# Patient Record
Sex: FEMALE | Race: WHITE | Employment: OTHER | URBAN - METROPOLITAN AREA
[De-identification: names, ages, dates, MRNs, and addresses within clinical notes are randomized per-mention and may not be internally consistent; named-entity substitution may affect disease eponyms.]

---

## 2018-10-13 ENCOUNTER — APPOINTMENT (OUTPATIENT)
Dept: GENERAL RADIOLOGY | Age: 68
End: 2018-10-13
Attending: EMERGENCY MEDICINE
Payer: MEDICARE

## 2018-10-13 ENCOUNTER — HOSPITAL ENCOUNTER (EMERGENCY)
Age: 68
Discharge: HOME | End: 2018-10-13
Attending: EMERGENCY MEDICINE
Payer: MEDICARE

## 2018-10-13 VITALS
TEMPERATURE: 97.5 F | OXYGEN SATURATION: 100 % | WEIGHT: 265 LBS | RESPIRATION RATE: 16 BRPM | HEART RATE: 67 BPM | SYSTOLIC BLOOD PRESSURE: 160 MMHG | HEIGHT: 64 IN | DIASTOLIC BLOOD PRESSURE: 58 MMHG | BODY MASS INDEX: 45.24 KG/M2

## 2018-10-13 DIAGNOSIS — M17.12 OSTEOARTHRITIS OF LEFT KNEE, UNSPECIFIED OSTEOARTHRITIS TYPE: ICD-10-CM

## 2018-10-13 DIAGNOSIS — S89.92XA LEFT KNEE INJURY, INITIAL ENCOUNTER: Primary | ICD-10-CM

## 2018-10-13 DIAGNOSIS — I10 ESSENTIAL HYPERTENSION: ICD-10-CM

## 2018-10-13 PROCEDURE — 73562 X-RAY EXAM OF KNEE 3: CPT

## 2018-10-13 PROCEDURE — 74011250637 HC RX REV CODE- 250/637: Performed by: PHYSICIAN ASSISTANT

## 2018-10-13 PROCEDURE — 99283 EMERGENCY DEPT VISIT LOW MDM: CPT

## 2018-10-13 RX ORDER — GLIMEPIRIDE 4 MG/1
4 TABLET ORAL
COMMUNITY

## 2018-10-13 RX ORDER — ESOMEPRAZOLE MAGNESIUM 40 MG/1
80 CAPSULE, DELAYED RELEASE ORAL DAILY
COMMUNITY

## 2018-10-13 RX ORDER — PIOGLITAZONEHYDROCHLORIDE 30 MG/1
30 TABLET ORAL DAILY
COMMUNITY

## 2018-10-13 RX ORDER — LISINOPRIL 10 MG/1
5 TABLET ORAL DAILY
COMMUNITY

## 2018-10-13 RX ORDER — METFORMIN HYDROCHLORIDE EXTENDED-RELEASE TABLETS 1000 MG/1
1000 TABLET, FILM COATED, EXTENDED RELEASE ORAL 2 TIMES DAILY
COMMUNITY

## 2018-10-13 RX ORDER — TRAMADOL HYDROCHLORIDE 50 MG/1
50 TABLET ORAL
Status: COMPLETED | OUTPATIENT
Start: 2018-10-13 | End: 2018-10-13

## 2018-10-13 RX ORDER — DICLOFENAC SODIUM 10 MG/G
2 GEL TOPICAL 4 TIMES DAILY
Qty: 100 G | Refills: 0 | Status: SHIPPED | OUTPATIENT
Start: 2018-10-13

## 2018-10-13 RX ORDER — ROSUVASTATIN CALCIUM 5 MG/1
5 TABLET, COATED ORAL
COMMUNITY

## 2018-10-13 RX ORDER — ESOMEPRAZOLE MAGNESIUM 20 MG/1
FOR SUSPENSION ORAL DAILY
COMMUNITY

## 2018-10-13 RX ADMIN — TRAMADOL HYDROCHLORIDE 50 MG: 50 TABLET, FILM COATED ORAL at 12:35

## 2018-10-13 NOTE — DISCHARGE INSTRUCTIONS
High Blood Pressure: Care Instructions  Your Care Instructions    If your blood pressure is usually above 130/80, you have high blood pressure, or hypertension. That means the top number is 130 or higher or the bottom number is 80 or higher, or both. Despite what a lot of people think, high blood pressure usually doesn't cause headaches or make you feel dizzy or lightheaded. It usually has no symptoms. But it does increase your risk for heart attack, stroke, and kidney or eye damage. The higher your blood pressure, the more your risk increases. Your doctor will give you a goal for your blood pressure. Your goal will be based on your health and your age. Lifestyle changes, such as eating healthy and being active, are always important to help lower blood pressure. You might also take medicine to reach your blood pressure goal.  Follow-up care is a key part of your treatment and safety. Be sure to make and go to all appointments, and call your doctor if you are having problems. It's also a good idea to know your test results and keep a list of the medicines you take. How can you care for yourself at home? Medical treatment  · If you stop taking your medicine, your blood pressure will go back up. You may take one or more types of medicine to lower your blood pressure. Be safe with medicines. Take your medicine exactly as prescribed. Call your doctor if you think you are having a problem with your medicine. · Talk to your doctor before you start taking aspirin every day. Aspirin can help certain people lower their risk of a heart attack or stroke. But taking aspirin isn't right for everyone, because it can cause serious bleeding. · See your doctor regularly. You may need to see the doctor more often at first or until your blood pressure comes down. · If you are taking blood pressure medicine, talk to your doctor before you take decongestants or anti-inflammatory medicine, such as ibuprofen.  Some of these medicines can raise blood pressure. · Learn how to check your blood pressure at home. Lifestyle changes  · Stay at a healthy weight. This is especially important if you put on weight around the waist. Losing even 10 pounds can help you lower your blood pressure. · If your doctor recommends it, get more exercise. Walking is a good choice. Bit by bit, increase the amount you walk every day. Try for at least 30 minutes on most days of the week. You also may want to swim, bike, or do other activities. · Avoid or limit alcohol. Talk to your doctor about whether you can drink any alcohol. · Try to limit how much sodium you eat to less than 2,300 milligrams (mg) a day. Your doctor may ask you to try to eat less than 1,500 mg a day. · Eat plenty of fruits (such as bananas and oranges), vegetables, legumes, whole grains, and low-fat dairy products. · Lower the amount of saturated fat in your diet. Saturated fat is found in animal products such as milk, cheese, and meat. Limiting these foods may help you lose weight and also lower your risk for heart disease. · Do not smoke. Smoking increases your risk for heart attack and stroke. If you need help quitting, talk to your doctor about stop-smoking programs and medicines. These can increase your chances of quitting for good. When should you call for help? Call 911 anytime you think you may need emergency care. This may mean having symptoms that suggest that your blood pressure is causing a serious heart or blood vessel problem. Your blood pressure may be over 180/120.   For example, call 911 if:    · You have symptoms of a heart attack. These may include:  ¨ Chest pain or pressure, or a strange feeling in the chest.  ¨ Sweating. ¨ Shortness of breath. ¨ Nausea or vomiting. ¨ Pain, pressure, or a strange feeling in the back, neck, jaw, or upper belly or in one or both shoulders or arms. ¨ Lightheadedness or sudden weakness.   ¨ A fast or irregular heartbeat.     · You have symptoms of a stroke. These may include:  ¨ Sudden numbness, tingling, weakness, or loss of movement in your face, arm, or leg, especially on only one side of your body. ¨ Sudden vision changes. ¨ Sudden trouble speaking. ¨ Sudden confusion or trouble understanding simple statements. ¨ Sudden problems with walking or balance. ¨ A sudden, severe headache that is different from past headaches.     · You have severe back or belly pain.    Do not wait until your blood pressure comes down on its own. Get help right away.   Call your doctor now or seek immediate care if:    · Your blood pressure is much higher than normal (such as 180/120 or higher), but you don't have symptoms.     · You think high blood pressure is causing symptoms, such as:  ¨ Severe headache. ¨ Blurry vision.    Watch closely for changes in your health, and be sure to contact your doctor if:    · Your blood pressure measures higher than your doctor recommends at least 2 times. That means the top number is higher or the bottom number is higher, or both.     · You think you may be having side effects from your blood pressure medicine. Where can you learn more? Go to http://jollyUpmann'svernon.info/. Enter B984 in the search box to learn more about \"High Blood Pressure: Care Instructions. \"  Current as of: December 6, 2017  Content Version: 11.8  © 2483-3412 [employer]. Care instructions adapted under license by [employer] (which disclaims liability or warranty for this information). If you have questions about a medical condition or this instruction, always ask your healthcare professional. [place] [address] any warranty or liability for your use of this information. Learning About How to Use a [address] Instructions  Using a walker can help you move with less pain and more stability.  A walker can help you be more independent and safe as you do your daily activities. Be sure your walker fits you. When you stand up in your normal posture and rest your hands on the walker's hand , your hands should be even with the tops of your legs. Your elbows should be slightly bent. To stay safe when using a walker:  · Look straight ahead, not down at your feet. · Clear away small rugs, cords, or anything else that could cause you to trip, slip, or fall. · Be very careful around pets and small children. They can get in your path when you least expect it. · Be sure the rubber tips on your walker are clean and in good condition to help prevent slipping. · Avoid slick conditions, such as wet floors and snowy or icy driveways. In bad weather, be especially careful on curbs and steps. How to use a walker  Getting started    1. Set the walker at arm's length in front of you, with all four legs on the floor. If your walker has wheels on the front legs, push the walker forward so it's an arm's length in front of you. Walking with a walker    1. Use the handles of the walker for balance as you move your weak or injured leg forward to the middle area of the walker. Don't step all the way to the front. 2. Push straight down on the handles of the walker as you bring your strong leg up, so it is even with your injured leg. 3. Repeat. Getting out of a chair and sitting down    1. To get out of a chair, use both hands and push against the arms of your chair. Then put both hands on your walker. 2. Don't use your walker to help you stand up or sit down. 3. To sit, back up to the chair. Touch the back of your legs to the chair. 4. Support most of your weight on your strong leg, and reach back for the arms of the chair. 5. Slowly and carefully lower yourself into the chair. Going up and down a curb    1. The first few times you try this, have another person nearby to steady you if needed.   2. Stand as close to the edge as you can while keeping all four legs of the walker on the surface you're standing on.  3. When you have your balance, move the walker up or down to the surface you are moving to. 4. Push straight down on the handles for balance and to take weight off your injured leg. 5. If you are going up, step up with your stronger leg first, and then bring your weaker or injured leg up to meet it. If you are going down, step down with your weaker leg first, and then bring your stronger leg down to meet it. (Remember \"up with the good, and down with the bad\" to help you lead with the correct leg.)  6. Get your balance again before you start to walk. Follow-up care is a key part of your treatment and safety. Be sure to make and go to all appointments, and call your doctor if you are having problems. It's also a good idea to know your test results and keep a list of the medicines you take. Where can you learn more? Go to http://jolly-vernon.info/. Enter M137 in the search box to learn more about \"Learning About How to Use a Walker. \"  Current as of: November 29, 2017  Content Version: 11.8  © 9485-2069 [employer]. Care instructions adapted under license by [employer] (which disclaims liability or warranty for this information). If you have questions about a medical condition or this instruction, always ask your healthcare professional. [address] any warranty or liability for your use of this information. Knee Arthritis: Care Instructions  Your Care Instructions    Knee arthritis is a breakdown of the cartilage that cushions your knee joint. When the cartilage wears down, your bones rub against each other. This causes pain and stiffness. Knee arthritis tends to get worse with time. Treatment for knee arthritis involves reducing pain, making the leg muscles stronger, and staying at a healthy body weight.  The treatment usually does not improve the health of the cartilage, but it can reduce pain and improve how well your knee works. You can take simple measures to protect your knee joints, ease your pain, and help you stay active. Follow-up care is a key part of your treatment and safety. Be sure to make and go to all appointments, and call your doctor if you are having problems. It's also a good idea to know your test results and keep a list of the medicines you take. How can you care for yourself at home? · Know that knee arthritis will cause more pain on some days than on others. · Stay at a healthy weight. Lose weight if you are overweight. When you stand up, the pressure on your knees from every pound of body weight is multiplied four times. So if you lose 10 pounds, you will reduce the pressure on your knees by 40 pounds. · Talk to your doctor or physical therapist about exercises that will help ease joint pain. ¨ Stretch to help prevent stiffness and to prevent injury before you exercise. You may enjoy gentle forms of yoga to help keep your knee joints and muscles flexible. ¨ Walk instead of jog. ¨ Ride a bike. This makes your thigh muscles stronger and takes pressure off your knee. ¨ Wear well-fitting and comfortable shoes. ¨ Exercise in chest-deep water. This can help you exercise longer with less pain. ¨ Avoid exercises that include squatting or kneeling. They can put a lot of strain on your knees. ¨ Talk to your doctor to make sure that the exercise you do is not making the arthritis worse. · Do not sit for long periods of time. Try to walk once in a while to keep your knee from getting stiff. · Ask your doctor or physical therapist whether shoe inserts may reduce your arthritis pain. · If you can afford it, get new athletic shoes at least every year. This can help reduce the strain on your knees. · Use a device to help you do everyday activities. ¨ A cane or walking stick can help you keep your balance when you walk. Hold the cane or walking stick in the hand opposite the painful knee.   ¨ If you feel like you may fall when you walk, try using crutches or a front-wheeled walker. These can prevent falls that could cause more damage to your knee. ¨ A knee brace may help keep your knee stable and prevent pain. ¨ You also can use other things to make life easier, such as a higher toilet seat and handrails in the bathtub or shower. · Take pain medicines exactly as directed. ¨ Do not wait until you are in severe pain. You will get better results if you take it sooner. ¨ If you are not taking a prescription pain medicine, take an over-the-counter medicine such as acetaminophen (Tylenol), ibuprofen (Advil, Motrin), or naproxen (Aleve). Read and follow all instructions on the label. ¨ Do not take two or more pain medicines at the same time unless the doctor told you to. Many pain medicines have acetaminophen, which is Tylenol. Too much acetaminophen (Tylenol) can be harmful. ¨ Tell your doctor if you take a blood thinner, have diabetes, or have allergies to shellfish. · Ask your doctor if you might benefit from a shot of steroid medicine into your knee. This may provide pain relief for several months. · Many people take the supplements glucosamine and chondroitin for osteoarthritis. Some people feel they help, but the medical research does not show that they work. Talk to your doctor before you take these supplements. When should you call for help? Call your doctor now or seek immediate medical care if:    · You have sudden swelling, warmth, or pain in your knee.     · You have knee pain and a fever or rash.     · You have such bad pain that you cannot use your knee.    Watch closely for changes in your health, and be sure to contact your doctor if you have any problems. Where can you learn more? Go to http://jolly-vernon.info/. Enter J814 in the search box to learn more about \"Knee Arthritis: Care Instructions. \"  Current as of: June 11, 2018  Content Version: 11.8  © 2374-5014 [employer]. Care instructions adapted under license by [employer] (which disclaims liability or warranty for this information). If you have questions about a medical condition or this instruction, always ask your healthcare professional. [address] any warranty or liability for your use of this information.

## 2018-10-13 NOTE — ED TRIAGE NOTES
Left knee heard a pop as pulling herself up into jeep last night, no fall. In need of a right knee replacement so ambulation is difficult.

## 2018-10-13 NOTE — ED PROVIDER NOTES
HPI Comments: 11:36 AM 
I have evaluated the patient as the Provider in Triage. I have reviewed Her vital signs and the triage nurse assessment. I have talked with the patient and any available family and advised that I am the provider in triage and have ordered the appropriate study to initiate their work up based on the clinical presentation during my assessment. I have advised that the patient will be accommodated in the Main ED as soon as possible. I have also requested to contact the triage nurse or myself immediately if the patient experiences any changes in their condition during this brief waiting period. True Barrera MD 
 
Nguyễn Pisano is a 80 y.o. female who presents ambulatory with  to the ED with a c/o left knee pain x weeks, worse since yesterday. Pt notes she has chronic right knee pain and is \"due for a knee replacement\" she notes she typically puts more stress on her left knee to compensate for her right. Last pm, she was getting in her jeep and twisted her left knee with a loud pop and the pain has been more significant since. She denies falling, head injury, loc or other injury. She took aleve last pm with minimal help. Pt denies other sx, but notes it is harder to walk without pain. She specifically denies any fevers, chills, nausea, vomiting, chest pain, abd pain, urinary sx, shortness of breath, headache, rash, diarrhea, sweating or weight loss. Yvette Bryson PCP: Karlie Dunn MD 
PMHx significant for: Past Medical History: 
No date: Arthritis No date: Asthma No date: Diabetes ([facility] [address] No date: GERD (gastroesophageal reflux disease) No date: Hypercholesteremia No date: Hypertension PSHx significant for: Past Surgical History: 
No date: HX CORONARY STENT PLACEMENT Right No date: HX ROTATOR CUFF REPAIR Right Social Hx: Tobacco: denies  EtOH: social Illicit drug use: denies There are no further complaints or symptoms at this time. The history is provided by the patient and the spouse. Past Medical History:  
Diagnosis Date  Arthritis  Asthma  Diabetes ([address]  GERD (gastroesophageal reflux disease)  Hypercholesteremia  Hypertension Past Surgical History:  
Procedure Laterality Date  HX CORONARY STENT PLACEMENT Right  HX ROTATOR CUFF REPAIR Right History reviewed. No pertinent family history. Social History Social History  Marital status:  Spouse name: N/A  
 Number of children: N/A  
 Years of education: N/A Occupational History  Not on file. Social History Main Topics  Smoking status: Never Smoker  Smokeless tobacco: Never Used  Alcohol use No  
 Drug use: Not on file  Sexual activity: Not on file Other Topics Concern  Not on file Social History Narrative  No narrative on file ALLERGIES: Review of patient's allergies indicates no known allergies. Review of Systems Constitutional: Negative for chills and fever. HENT: Negative for congestion, rhinorrhea, sneezing and sore throat. Eyes: Negative for redness and visual disturbance. Respiratory: Negative for shortness of breath. Cardiovascular: Negative for chest pain and leg swelling. Gastrointestinal: Negative for abdominal pain, nausea and vomiting. Genitourinary: Negative for difficulty urinating and frequency. Musculoskeletal: Positive for arthralgias and gait problem. Negative for back pain, myalgias and neck stiffness. Skin: Negative for rash. Neurological: Negative for dizziness, syncope, weakness and headaches. Hematological: Negative for adenopathy. Vitals:  
 10/13/18 1134 10/13/18 1244 BP: (!) 202/79 160/58 Pulse: 69 67 Resp: 16 16 Temp: 97.5 °F (36.4 °C) SpO2: 99% 100% Weight: 120.2 kg (265 lb) Height: 5' 4\" (1.626 m) Physical Exam  
Constitutional: She is oriented to person, place, and time.  She appears well-developed and well-nourished. No distress. Markedly above average bmi female HENT:  
Head: Normocephalic and atraumatic. Right Ear: External ear normal.  
Left Ear: External ear normal.  
Eyes: EOM are normal. Pupils are equal, round, and reactive to light. Neck: Neck supple. Cardiovascular: Normal rate, regular rhythm, normal heart sounds and intact distal pulses. Exam reveals no gallop and no friction rub. No murmur heard. Pulmonary/Chest: Effort normal and breath sounds normal. No stridor. No respiratory distress. She has no wheezes. She has no rales. She exhibits no tenderness. Abdominal: Soft. Bowel sounds are normal. She exhibits no distension and no mass. There is no tenderness. There is no rebound and no guarding. Musculoskeletal: Normal range of motion. She exhibits edema and tenderness. She exhibits no deformity. Left knee diffusely ttp lateral and posterior greater than medial with slight STS, increased pain with ROM. Distal n/v intact. Cap refill brisk. Normothermic. No laxity. Ambulates without assistance. Neurological: She is alert and oriented to person, place, and time. No cranial nerve deficit. Coordination normal.  
Skin: No rash noted. No erythema. No pallor. Psychiatric: She has a normal mood and affect. Her behavior is normal.  
Nursing note and vitals reviewed. MDM Number of Diagnoses or Management Options Amount and/or Complexity of Data Reviewed Tests in the radiology section of CPT®: ordered and reviewed Obtain history from someone other than the patient: yes () Review and summarize past medical records: yes Independent visualization of images, tracings, or specimens: yes Patient Progress Patient progress: stable ED Course Procedures 12:00 PM 
Discussed with patient at length the need for blood pressure re-evaluation and management with primary care.  Discussed the long term sequelae for elevated blood pressure including blindness, CVA, MI, and renal failure/ dialysis. Pt agrees to follow up as directed. CLEMENTINA Whitt  
 
12:00 PM 
Discussed pt, sx, hx and current findings with Iva Balbuena MD. He is in agreement with plan and will see pt. Will get xrays and ice Cherie Dhaliwal. DANIEL Alberto 
 
 
12:34 PM  
Updated pt on current findings. Will place pt in knee immobilizer and give walker. Will repeat bp. Pt from [address] [place] [address]. Will burn xray to disc and give her ortho phone # in [facility] per pt request 
Cherie Dhaliwal. DANIEL Alberto 
 
 
LABORATORY TESTS: 
No results found for this or any previous visit (from the past 12 hour(s)). IMAGING RESULTS: 
 
Xr Knee Lt 3 V Result Date: 10/13/2018 EXAM:  XR KNEE LT 3 V INDICATION:   Trauma. Injury last night with pop and felt pain in left knee COMPARISON: None. FINDINGS: Three views of the left knee demonstrate normal alignment. Mild degenerative changes of the medial and patellofemoral compartment. There are vascular calcifications. There is no effusion. IMPRESSION:  Mild DJD. No effusion or fracture. MEDICATIONS GIVEN: 
Medications  
traMADol (ULTRAM) tablet 50 mg (50 mg Oral Given 10/13/18 1235) IMPRESSION: 
1. Left knee injury, initial encounter 2. Osteoarthritis of left knee, unspecified osteoarthritis type 3. Essential hypertension PLAN: 
1. Discharge Medication List as of 10/13/2018 12:49 PM  
  
START taking these medications Details  
diclofenac (VOLTAREN) 1 % gel Apply 2 g to affected area four (4) times daily. , Print, Disp-100 g, R-0  
  
  
CONTINUE these medications which have NOT CHANGED Details  
naproxen sodium (ALEVE PO) Take  by mouth., Historical Med  
  
lisinopril (PRINIVIL, ZESTRIL) 10 mg tablet Take 5 mg by mouth daily. , Historical Med  
  
glimepiride (AMARYL) 4 mg tablet Take 4 mg by mouth every morning., Historical Med  
  
metFORMIN ER 1,000 mg tr24 Take 1,000 mg by mouth two (2) times a day. , Historical Med  
  
rosuvastatin (CRESTOR) 5 mg tablet Take 5 mg by mouth every Monday, Wednesday, Friday., Historical Med  
  
pioglitazone (ACTOS) 30 mg tablet Take 30 mg by mouth daily. , Historical Med  
  
Esomeprazole Magnesium (NEXIUM PACKET) Take  by mouth daily. , Historical Med  
  
esomeprazole (NEXIUM) 40 mg capsule Take 80 mg by mouth daily. , Historical Med  
  
insulin detemir (LEVEMIR FLEXPEN SC) 17 Units by SubCUTAneous route nightly., Historical Med 2. Follow-up Information Follow up With Details Comments Contact Info Dr. Helena Clark MD Schedule an appointment as soon as possible for a visit 2-4 days for recheck Address: [address] [place] [address] Hours:  
Closed · Opens 8AM Mon Phone: [phone] Return to ED if worse 12:40 PM 
Pt has been reexamined. Pt has no new complaints, changes or physical findings. Care plan outlined and precautions discussed. All available results were reviewed with pt. All medications were reviewed with pt. All of pt's questions and concerns were addressed. Pt agrees to F/U as instructed and agrees to return to ED upon further deterioration. Pt is ready to go home.  
CLEMENTINA Diaz